# Patient Record
Sex: FEMALE | Race: WHITE | Employment: OTHER | URBAN - METROPOLITAN AREA
[De-identification: names, ages, dates, MRNs, and addresses within clinical notes are randomized per-mention and may not be internally consistent; named-entity substitution may affect disease eponyms.]

---

## 2020-04-04 ENCOUNTER — APPOINTMENT (OUTPATIENT)
Dept: GENERAL RADIOLOGY | Facility: CLINIC | Age: 40
End: 2020-04-04
Attending: EMERGENCY MEDICINE

## 2020-04-04 ENCOUNTER — HOSPITAL ENCOUNTER (EMERGENCY)
Facility: CLINIC | Age: 40
Discharge: HOME | End: 2020-04-04
Attending: EMERGENCY MEDICINE | Admitting: EMERGENCY MEDICINE

## 2020-04-04 VITALS
RESPIRATION RATE: 15 BRPM | HEART RATE: 91 BPM | DIASTOLIC BLOOD PRESSURE: 67 MMHG | TEMPERATURE: 98 F | SYSTOLIC BLOOD PRESSURE: 115 MMHG | OXYGEN SATURATION: 96 % | HEIGHT: 67 IN

## 2020-04-04 DIAGNOSIS — S46.912A STRAIN OF LEFT SHOULDER, INITIAL ENCOUNTER: ICD-10-CM

## 2020-04-04 PROCEDURE — 99283 EMERGENCY DEPT VISIT LOW MDM: CPT

## 2020-04-04 PROCEDURE — 73030 X-RAY EXAM OF SHOULDER: CPT | Mod: LT

## 2020-04-04 PROCEDURE — 25000132 ZZH RX MED GY IP 250 OP 250 PS 637: Performed by: EMERGENCY MEDICINE

## 2020-04-04 RX ORDER — OXYCODONE AND ACETAMINOPHEN 5; 325 MG/1; MG/1
1-2 TABLET ORAL EVERY 4 HOURS PRN
Qty: 12 TABLET | Refills: 0 | Status: SHIPPED | OUTPATIENT
Start: 2020-04-04

## 2020-04-04 RX ORDER — OXYCODONE HYDROCHLORIDE 5 MG/1
10 TABLET ORAL ONCE
Status: COMPLETED | OUTPATIENT
Start: 2020-04-04 | End: 2020-04-04

## 2020-04-04 RX ADMIN — OXYCODONE HYDROCHLORIDE 10 MG: 5 TABLET ORAL at 15:28

## 2020-04-04 NOTE — ED PROVIDER NOTES
"  History     Chief Complaint:  Shoulder Injury      HPI   Loree Arciniega is a 39 year old right handed female who presents to the emergency department for evaluation of a shoulder injury. The patient reports that three hours ago she was walking her sister's 250 lbs English Mastiff dog, and while she was sitting with the leash wrapped around her left wrist, the dog suddenly took off running, resulting in a \"searing\" pain in her left shoulder that worsens with movement. The pain has been progressing and she had no improvement with two Extra Strength Tylenol which prompted her evaluation.     Allergies:  NSAIDs     Medications:    The patient is not currently taking any prescribed medications.    Past Medical History:    History reviewed.  No significant past medical history.     Past Surgical History:    L rotator cuff repair  R biceps tendon repair     Family History:    History reviewed. No pertinent family history.    Social History:  Lives in [place]  PCP: Physician No Ref-Primary     Review of Systems   Musculoskeletal:        Positive for left shoulder pain.   All other systems reviewed and are negative.        Physical Exam     Patient Vitals for the past 24 hrs:   BP Temp Temp src Pulse Heart Rate Resp SpO2 Height   04/04/20 1600 115/67 -- -- 91 -- -- 96 % --   04/04/20 1521 135/79 98  F (36.7  C) Oral -- 118 15 99 % 1.702 m (5' 7\")     Physical Exam  GENERAL: well developed, pleasant  HEAD: atraumatic  EYES: pupils reactive, extraocular muscles intact, conjunctivae normal  ENT:  mucus membranes moist  NECK:  trachea midline, normal range of motion  RESPIRATORY: no tachypnea, breath sounds clear to auscultation   CVS: normal S1/S2, no murmurs, intact distal pulses  ABDOMEN: soft, nontender, nondistention  MUSCULOSKELETAL: no deformities, pain to left shoulder when tempting flexion, limited range of motion secondary to pain, no obvious dislocation  SKIN: warm and dry, no acute rashes or ulceration  NEURO: GCS 15, " cranial nerves intact, alert and oriented x3  PSYCH:  Mood/affect normal    Emergency Department Course   Imaging:  Shoulder XR, G/E 3 views, Left:   IMPRESSION: No evidence for fracture or dislocation. Degenerative change or potentially postsurgical change to the glenoid. None of these findings appear acute.  Reading per radiology.     Radiographic findings were communicated with the patient who voiced understanding of the findings.    Interventions:  1528 Roxicodone 10 mg tablet PO    Emergency Department Course:  1520 Nursing notes and vitals reviewed. I performed an exam of the patient as documented above.     Medicine administered as documented above.    The patient was sent for a shoulder XR while in the emergency department, findings above.     1545 I rechecked the patient and discussed the results of her workup thus far.     Findings and plan explained to the Patient. Patient discharged home with instructions regarding supportive care, medications, and reasons to return. The importance of close follow-up was reviewed. The patient was prescribed Percocet.    Impression & Plan    Medical Decision Making:      Presents with left shoulder pain.  She notes prior surgery numerous years ago.  She is visiting from out of town.  Her sister's large dog took off suddenly jerking her left arm and external rotation type manner.  She has pain with subtle movement as well as flexion.  No obvious dislocation on exam and x-rays shows no acute injuries.  Certainly strain versus tendon rupture or ligamentous injury are all considered.  Patient was given pain control as well as ice and a sling.  Discussed outpatient management with her as well as gradual range of motion to prevent frozen shoulder.  She can follow-up with her care team back in [place].    Diagnosis:    ICD-10-CM    1. Strain of left shoulder, initial encounter  S46.912A        Disposition:  Discharged to home    Discharge Medications:  Discharge Medication  List as of 4/4/2020  3:56 PM      START taking these medications    Details   oxyCODONE-acetaminophen (PERCOCET) 5-325 MG tablet Take 1-2 tablets by mouth every 4 hours as needed for pain, Disp-12 tablet,R-0, Local Print           Scribe Disclosure:  I, Mauricio Sosa, am serving as a scribe on 4/4/2020 at 3:20 PM to personally document services performed by Indra Horn MD based on my observations and the provider's statements to me.     Mauricio Sosa  4/4/2020    EMERGENCY DEPARTMENT       Indra Santos MD  04/05/20 [id number]

## 2020-04-04 NOTE — ED TRIAGE NOTES
Walking sisters dog about 3 hours ago and dog saw something and ran, L shoulder immediately had sharp burning pain.  XTra strength tylenol taken.

## 2020-04-04 NOTE — ED AVS SNAPSHOT
Emergency Department  [address] [place] [address]  Phone:  [phone]  Fax:  [phone]                                    Loree Arciniega   MRN: [id number]    Department:   Emergency Department   Date of Visit:  4/4/2020           After Visit Summary Signature Page    I have received my discharge instructions, and my questions have been answered. I have discussed any challenges I see with this plan with the nurse or doctor.    ..........................................................................................................................................  Patient/Patient Representative Signature      ..........................................................................................................................................  Patient Representative Print Name and Relationship to Patient    ..................................................               ................................................  Date                                   Time    ..........................................................................................................................................  Reviewed by Signature/Title    ...................................................              ..............................................  Date                                               Time          22EPIC Rev 08/18